# Patient Record
Sex: FEMALE | NOT HISPANIC OR LATINO | Employment: OTHER | URBAN - METROPOLITAN AREA
[De-identification: names, ages, dates, MRNs, and addresses within clinical notes are randomized per-mention and may not be internally consistent; named-entity substitution may affect disease eponyms.]

---

## 2023-05-04 ENCOUNTER — OFFICE VISIT (OUTPATIENT)
Dept: PRIMARY CARE | Facility: CLINIC | Age: 78
End: 2023-05-04
Payer: MEDICARE

## 2023-05-04 VITALS
HEIGHT: 62 IN | HEART RATE: 102 BPM | DIASTOLIC BLOOD PRESSURE: 83 MMHG | SYSTOLIC BLOOD PRESSURE: 138 MMHG | TEMPERATURE: 97.8 F | WEIGHT: 243 LBS | BODY MASS INDEX: 44.72 KG/M2 | OXYGEN SATURATION: 94 %

## 2023-05-04 DIAGNOSIS — Z00.00 ROUTINE GENERAL MEDICAL EXAMINATION AT HEALTH CARE FACILITY: Primary | ICD-10-CM

## 2023-05-04 DIAGNOSIS — R53.83 FATIGUE, UNSPECIFIED TYPE: ICD-10-CM

## 2023-05-04 DIAGNOSIS — E55.9 VITAMIN D DEFICIENCY: ICD-10-CM

## 2023-05-04 PROBLEM — M79.89 LEFT LEG SWELLING: Status: ACTIVE | Noted: 2023-05-04

## 2023-05-04 PROBLEM — K22.70 BARRETT'S ESOPHAGUS: Status: ACTIVE | Noted: 2023-05-04

## 2023-05-04 PROBLEM — M17.0 PRIMARY OSTEOARTHRITIS OF BOTH KNEES: Status: ACTIVE | Noted: 2017-03-09

## 2023-05-04 PROBLEM — E66.09 OBESITY DUE TO EXCESS CALORIES: Status: ACTIVE | Noted: 2017-05-20

## 2023-05-04 PROBLEM — E66.01 OBESITY, CLASS III, BMI 40-49.9 (MORBID OBESITY) (MULTI): Status: ACTIVE | Noted: 2017-05-20

## 2023-05-04 PROBLEM — M54.16 LEFT LUMBAR RADICULITIS: Status: ACTIVE | Noted: 2023-05-04

## 2023-05-04 PROBLEM — D17.9 LIPOMA OF JOINT: Status: ACTIVE | Noted: 2023-05-04

## 2023-05-04 PROBLEM — R13.10 DYSPHAGIA: Status: ACTIVE | Noted: 2017-01-20

## 2023-05-04 PROBLEM — E66.813 OBESITY, CLASS III, BMI 40-49.9 (MORBID OBESITY): Status: ACTIVE | Noted: 2017-05-20

## 2023-05-04 PROBLEM — E88.810 DYSMETABOLIC SYNDROME: Status: ACTIVE | Noted: 2023-05-04

## 2023-05-04 PROBLEM — E88.2 DERCUM'S DISEASE: Status: ACTIVE | Noted: 2023-05-04

## 2023-05-04 PROBLEM — S83.207A: Status: ACTIVE | Noted: 2017-03-09

## 2023-05-04 PROBLEM — R53.81 DEBILITY: Status: ACTIVE | Noted: 2023-05-04

## 2023-05-04 PROBLEM — E78.5 HYPERLIPIDEMIA: Status: ACTIVE | Noted: 2023-05-04

## 2023-05-04 PROCEDURE — G0439 PPPS, SUBSEQ VISIT: HCPCS | Performed by: INTERNAL MEDICINE

## 2023-05-04 PROCEDURE — 1036F TOBACCO NON-USER: CPT | Performed by: INTERNAL MEDICINE

## 2023-05-04 PROCEDURE — 1170F FXNL STATUS ASSESSED: CPT | Performed by: INTERNAL MEDICINE

## 2023-05-04 PROCEDURE — 96372 THER/PROPH/DIAG INJ SC/IM: CPT | Performed by: INTERNAL MEDICINE

## 2023-05-04 PROCEDURE — 1125F AMNT PAIN NOTED PAIN PRSNT: CPT | Performed by: INTERNAL MEDICINE

## 2023-05-04 PROCEDURE — 1159F MED LIST DOCD IN RCRD: CPT | Performed by: INTERNAL MEDICINE

## 2023-05-04 RX ORDER — OMEPRAZOLE 40 MG/1
40 CAPSULE, DELAYED RELEASE ORAL DAILY
COMMUNITY

## 2023-05-04 RX ORDER — ACETAMINOPHEN 500 MG
1000 TABLET ORAL EVERY 6 HOURS PRN
COMMUNITY
Start: 2017-02-21

## 2023-05-04 RX ORDER — CYANOCOBALAMIN 1000 UG/ML
1000 INJECTION, SOLUTION INTRAMUSCULAR; SUBCUTANEOUS ONCE
Status: COMPLETED | OUTPATIENT
Start: 2023-05-04 | End: 2023-05-04

## 2023-05-04 RX ORDER — NAPROXEN SODIUM 220 MG
TABLET ORAL
COMMUNITY
Start: 2012-02-24

## 2023-05-04 RX ORDER — MULTIVITAMIN
1 TABLET ORAL
COMMUNITY
Start: 2012-02-24

## 2023-05-04 RX ORDER — METFORMIN HYDROCHLORIDE 1000 MG/1
1000 TABLET ORAL 2 TIMES DAILY
COMMUNITY
End: 2024-02-15

## 2023-05-04 RX ADMIN — CYANOCOBALAMIN 1000 MCG: 1000 INJECTION, SOLUTION INTRAMUSCULAR; SUBCUTANEOUS at 13:47

## 2023-05-04 ASSESSMENT — ACTIVITIES OF DAILY LIVING (ADL)
BATHING: INDEPENDENT
GROCERY_SHOPPING: INDEPENDENT
DOING_HOUSEWORK: INDEPENDENT
DRESSING: INDEPENDENT
MANAGING_FINANCES: INDEPENDENT
TAKING_MEDICATION: INDEPENDENT

## 2023-05-04 ASSESSMENT — PATIENT HEALTH QUESTIONNAIRE - PHQ9
SUM OF ALL RESPONSES TO PHQ9 QUESTIONS 1 AND 2: 0
1. LITTLE INTEREST OR PLEASURE IN DOING THINGS: NOT AT ALL
2. FEELING DOWN, DEPRESSED OR HOPELESS: NOT AT ALL

## 2023-05-04 NOTE — PROGRESS NOTES
"Subjective   Reason for Visit: Vee Reynoso is an 77 y.o. female here for a Medicare Wellness visit.     Past Medical, Surgical, and Family History reviewed and updated in chart.    Reviewed all medications by prescribing practitioner or clinical pharmacist (such as prescriptions, OTCs, herbal therapies and supplements) and documented in the medical record.    HPI  Here for Medicare wellness and Follow up.    She continued to lose some weight. But slowing down.  She Complains of feeling tired lately, she wakes up and gets up early but after drinking coffee etc, she feels sleepy tired and would go back to sleep till 10 or 11am. This ruins her day's schedule per pt. No sob cp leg edema.  No other specific symptoms  She would like b12 shot to see if that helps.      Patient Care Team:  Nick Johnson MD as PCP - General  Nick Johnson MD as PCP [facility]     Review of Systems  She has appointment with GI, for both EGD for loza's esophagus Follow up and for Colonoscopy for recent positive cologuard.  She does not want mammogram ordered at this time.      Objective   Vitals:  /83   Pulse 102   Temp 36.6 °C (97.8 °F)   Ht 1.575 m (5' 2\")   Wt 110 kg (243 lb)   SpO2 94%   BMI 44.45 kg/m²       Physical Exam  No carotid bruit  Neck exam showed no mass, lymphadenopathy, or thyroid enlargement, and no carotid bruit.  Heart exam showed normal heart sounds, no murmur, clicks, gallops or rubs. Regular rate and rhythm. Chest is clear; no wheezes or rales.  No Abdominal pain  No leg edema.    Assessment/Plan   Problem List Items Addressed This Visit    None  Visit Diagnoses       Routine general medical examination at health care facility    -  Primary            She will come in for Follow up to get weight bp checked before returning to see Dr. Ladd.  B12 1000mcg injection     "

## 2023-05-04 NOTE — PROGRESS NOTES
Medicare Wellness Billing Compliance Satisfied    Review all medications by prescribing practitioner or clinical pharmacist (such as prescriptions, OTCs, herbal therapies and supplements) documented in the medical record    Past Medical, Surgical, and Family History reviewed and updated in chart    Tobacco Use Reviewed    Alcohol Use Reviewed    Illicit Drug Use Reviewed    PHQ2/9    Falls in Last Year Reviewed    Home Safety Risk Factors Reviewed    Cognitive Impairment Reviewed    Patient Self Assessment and Health Status    Current Diet Reviewed    Exercise Frequency    ADL - Hearing Impairment    ADL - Bathing    ADL - Dressing    ADL - Walks in Home    IADL - Managing Finances    IADL - Grocery Shopping    IADL - Taking Medications    IADL - Doing Housework

## 2023-05-11 ENCOUNTER — LAB (OUTPATIENT)
Dept: LAB | Facility: LAB | Age: 78
End: 2023-05-11
Payer: MEDICARE

## 2023-05-11 DIAGNOSIS — Z00.00 ROUTINE GENERAL MEDICAL EXAMINATION AT HEALTH CARE FACILITY: ICD-10-CM

## 2023-05-11 DIAGNOSIS — R53.83 FATIGUE, UNSPECIFIED TYPE: ICD-10-CM

## 2023-05-11 DIAGNOSIS — E55.9 VITAMIN D DEFICIENCY: ICD-10-CM

## 2023-05-11 LAB
ALANINE AMINOTRANSFERASE (SGPT) (U/L) IN SER/PLAS: 13 U/L (ref 7–45)
ALBUMIN (G/DL) IN SER/PLAS: 4 G/DL (ref 3.4–5)
ALKALINE PHOSPHATASE (U/L) IN SER/PLAS: 71 U/L (ref 33–136)
ANION GAP IN SER/PLAS: 16 MMOL/L (ref 10–20)
ASPARTATE AMINOTRANSFERASE (SGOT) (U/L) IN SER/PLAS: 16 U/L (ref 9–39)
BILIRUBIN TOTAL (MG/DL) IN SER/PLAS: 0.4 MG/DL (ref 0–1.2)
CALCIDIOL (25 OH VITAMIN D3) (NG/ML) IN SER/PLAS: 57 NG/ML
CALCIUM (MG/DL) IN SER/PLAS: 10.2 MG/DL (ref 8.6–10.6)
CARBON DIOXIDE, TOTAL (MMOL/L) IN SER/PLAS: 24 MMOL/L (ref 21–32)
CHLORIDE (MMOL/L) IN SER/PLAS: 107 MMOL/L (ref 98–107)
CHOLESTEROL (MG/DL) IN SER/PLAS: 207 MG/DL (ref 0–199)
CHOLESTEROL IN HDL (MG/DL) IN SER/PLAS: 69.5 MG/DL
CHOLESTEROL/HDL RATIO: 3
CREATININE (MG/DL) IN SER/PLAS: 0.8 MG/DL (ref 0.5–1.05)
ERYTHROCYTE DISTRIBUTION WIDTH (RATIO) BY AUTOMATED COUNT: 14.1 % (ref 11.5–14.5)
ERYTHROCYTE MEAN CORPUSCULAR HEMOGLOBIN CONCENTRATION (G/DL) BY AUTOMATED: 31.9 G/DL (ref 32–36)
ERYTHROCYTE MEAN CORPUSCULAR VOLUME (FL) BY AUTOMATED COUNT: 95 FL (ref 80–100)
ERYTHROCYTES (10*6/UL) IN BLOOD BY AUTOMATED COUNT: 4.66 X10E12/L (ref 4–5.2)
ESTIMATED AVERAGE GLUCOSE FOR HBA1C: 103 MG/DL
GFR FEMALE: 76 ML/MIN/1.73M2
GLUCOSE (MG/DL) IN SER/PLAS: 72 MG/DL (ref 74–99)
HEMATOCRIT (%) IN BLOOD BY AUTOMATED COUNT: 44.2 % (ref 36–46)
HEMOGLOBIN (G/DL) IN BLOOD: 14.1 G/DL (ref 12–16)
HEMOGLOBIN A1C/HEMOGLOBIN TOTAL IN BLOOD: 5.2 %
LDL: 119 MG/DL (ref 0–99)
LEUKOCYTES (10*3/UL) IN BLOOD BY AUTOMATED COUNT: 7.5 X10E9/L (ref 4.4–11.3)
NRBC (PER 100 WBCS) BY AUTOMATED COUNT: 0 /100 WBC (ref 0–0)
PLATELETS (10*3/UL) IN BLOOD AUTOMATED COUNT: 392 X10E9/L (ref 150–450)
POTASSIUM (MMOL/L) IN SER/PLAS: 4.6 MMOL/L (ref 3.5–5.3)
PROTEIN TOTAL: 6.6 G/DL (ref 6.4–8.2)
SODIUM (MMOL/L) IN SER/PLAS: 142 MMOL/L (ref 136–145)
TRIGLYCERIDE (MG/DL) IN SER/PLAS: 91 MG/DL (ref 0–149)
UREA NITROGEN (MG/DL) IN SER/PLAS: 19 MG/DL (ref 6–23)
VLDL: 18 MG/DL (ref 0–40)

## 2023-05-11 PROCEDURE — 80053 COMPREHEN METABOLIC PANEL: CPT

## 2023-05-11 PROCEDURE — 36415 COLL VENOUS BLD VENIPUNCTURE: CPT

## 2023-05-11 PROCEDURE — 83036 HEMOGLOBIN GLYCOSYLATED A1C: CPT

## 2023-05-11 PROCEDURE — 85027 COMPLETE CBC AUTOMATED: CPT

## 2023-05-11 PROCEDURE — 82306 VITAMIN D 25 HYDROXY: CPT

## 2023-05-11 PROCEDURE — 80061 LIPID PANEL: CPT

## 2023-05-12 ENCOUNTER — TELEPHONE (OUTPATIENT)
Dept: PRIMARY CARE | Facility: CLINIC | Age: 78
End: 2023-05-12
Payer: MEDICARE

## 2023-05-12 NOTE — TELEPHONE ENCOUNTER
Pt is excited about how the B12 took effect.  She is inquiring on if she could get more injections and how often?

## 2023-07-11 ENCOUNTER — OFFICE VISIT (OUTPATIENT)
Dept: PRIMARY CARE | Facility: CLINIC | Age: 78
End: 2023-07-11
Payer: MEDICARE

## 2023-07-11 VITALS
TEMPERATURE: 97.8 F | OXYGEN SATURATION: 91 % | HEART RATE: 98 BPM | SYSTOLIC BLOOD PRESSURE: 133 MMHG | BODY MASS INDEX: 44.53 KG/M2 | HEIGHT: 62 IN | WEIGHT: 242 LBS | DIASTOLIC BLOOD PRESSURE: 77 MMHG

## 2023-07-11 DIAGNOSIS — R53.82 CHRONIC FATIGUE: Primary | ICD-10-CM

## 2023-07-11 PROCEDURE — 96372 THER/PROPH/DIAG INJ SC/IM: CPT | Performed by: INTERNAL MEDICINE

## 2023-07-11 PROCEDURE — 1036F TOBACCO NON-USER: CPT | Performed by: INTERNAL MEDICINE

## 2023-07-11 PROCEDURE — 99213 OFFICE O/P EST LOW 20 MIN: CPT | Performed by: INTERNAL MEDICINE

## 2023-07-11 PROCEDURE — 1159F MED LIST DOCD IN RCRD: CPT | Performed by: INTERNAL MEDICINE

## 2023-07-11 PROCEDURE — 1125F AMNT PAIN NOTED PAIN PRSNT: CPT | Performed by: INTERNAL MEDICINE

## 2023-07-11 RX ORDER — CYANOCOBALAMIN 1000 UG/ML
1000 INJECTION, SOLUTION INTRAMUSCULAR; SUBCUTANEOUS ONCE
Status: COMPLETED | OUTPATIENT
Start: 2023-07-11 | End: 2023-07-11

## 2023-07-11 RX ADMIN — CYANOCOBALAMIN 1000 MCG: 1000 INJECTION, SOLUTION INTRAMUSCULAR; SUBCUTANEOUS at 13:51

## 2023-07-11 NOTE — PROGRESS NOTES
"PCP: Nick Johnson MD   I have reviewed existing histories, notes, past medical history, surgical history, social history, family history, med list, allergy list, and immunization, and updated.    HPI:   Follow up fatigue  She received a b12 injection in office last visit, since then she is feeling better, and would like to get another injection.   She has appointment to see Dr. Ladd for obesity in two weeks.  No sob cp or leg edema  Lab Results   Component Value Date    WBC 7.5 05/11/2023    HGB 14.1 05/11/2023    HCT 44.2 05/11/2023     05/11/2023    CHOL 207 (H) 05/11/2023    TRIG 91 05/11/2023    HDL 69.5 05/11/2023    LDLDIRECT 138 (H) 08/11/2021    ALT 13 05/11/2023    AST 16 05/11/2023     05/11/2023    K 4.6 05/11/2023     05/11/2023    CREATININE 0.80 05/11/2023    BUN 19 05/11/2023    CO2 24 05/11/2023    TSH 1.72 08/11/2021    HGBA1C 5.2 05/11/2023     Physical exam:  /77   Pulse 98   Temp 36.6 °C (97.8 °F)   Ht 1.575 m (5' 2\")   Wt 110 kg (242 lb)   SpO2 91%   BMI 44.26 kg/m²    Neck exam showed no mass, lymphadenopathy, or thyroid enlargement, and no carotid bruit.  No carotid bruit  Heart RR  Lungs clear  No leg edema    Assessments/orders:   1. Chronic fatigue   B12 1000 mcg subcutaneous, x1  Follow up at end of October                "

## 2023-10-11 ENCOUNTER — APPOINTMENT (OUTPATIENT)
Dept: PRIMARY CARE | Facility: CLINIC | Age: 78
End: 2023-10-11
Payer: MEDICARE

## 2023-10-24 ENCOUNTER — APPOINTMENT (OUTPATIENT)
Dept: PRIMARY CARE | Facility: CLINIC | Age: 78
End: 2023-10-24
Payer: MEDICARE

## 2023-10-24 ENCOUNTER — OFFICE VISIT (OUTPATIENT)
Dept: PRIMARY CARE | Facility: CLINIC | Age: 78
End: 2023-10-24
Payer: MEDICARE

## 2023-10-24 VITALS
BODY MASS INDEX: 45.45 KG/M2 | OXYGEN SATURATION: 94 % | HEART RATE: 75 BPM | DIASTOLIC BLOOD PRESSURE: 79 MMHG | SYSTOLIC BLOOD PRESSURE: 138 MMHG | WEIGHT: 247 LBS | HEIGHT: 62 IN | TEMPERATURE: 98.1 F

## 2023-10-24 DIAGNOSIS — E55.9 VITAMIN D DEFICIENCY: ICD-10-CM

## 2023-10-24 DIAGNOSIS — Z23 NEED FOR PNEUMOCOCCAL 20-VALENT CONJUGATE VACCINATION: ICD-10-CM

## 2023-10-24 DIAGNOSIS — R53.83 OTHER FATIGUE: Primary | ICD-10-CM

## 2023-10-24 PROBLEM — R19.5 POSITIVE COLORECTAL CANCER SCREENING USING COLOGUARD TEST: Status: ACTIVE | Noted: 2023-10-24

## 2023-10-24 PROBLEM — R73.01 IMPAIRED FASTING GLUCOSE: Status: ACTIVE | Noted: 2023-05-19

## 2023-10-24 PROBLEM — G89.29 CHRONIC LOW BACK PAIN: Status: ACTIVE | Noted: 2023-05-19

## 2023-10-24 PROBLEM — R42 VERTIGO: Status: ACTIVE | Noted: 2023-05-19

## 2023-10-24 PROBLEM — M54.50 CHRONIC LOW BACK PAIN: Status: ACTIVE | Noted: 2023-05-19

## 2023-10-24 PROCEDURE — 96372 THER/PROPH/DIAG INJ SC/IM: CPT | Performed by: INTERNAL MEDICINE

## 2023-10-24 PROCEDURE — 1036F TOBACCO NON-USER: CPT | Performed by: INTERNAL MEDICINE

## 2023-10-24 PROCEDURE — 1125F AMNT PAIN NOTED PAIN PRSNT: CPT | Performed by: INTERNAL MEDICINE

## 2023-10-24 PROCEDURE — G0009 ADMIN PNEUMOCOCCAL VACCINE: HCPCS | Performed by: INTERNAL MEDICINE

## 2023-10-24 PROCEDURE — 99213 OFFICE O/P EST LOW 20 MIN: CPT | Performed by: INTERNAL MEDICINE

## 2023-10-24 PROCEDURE — 90677 PCV20 VACCINE IM: CPT | Performed by: INTERNAL MEDICINE

## 2023-10-24 PROCEDURE — 1159F MED LIST DOCD IN RCRD: CPT | Performed by: INTERNAL MEDICINE

## 2023-10-24 RX ORDER — CYANOCOBALAMIN 1000 UG/ML
1000 INJECTION, SOLUTION INTRAMUSCULAR; SUBCUTANEOUS ONCE
Status: COMPLETED | OUTPATIENT
Start: 2023-10-24 | End: 2023-10-24

## 2023-10-24 RX ORDER — TRAMADOL HYDROCHLORIDE 50 MG/1
TABLET ORAL
COMMUNITY
Start: 2023-07-27 | End: 2024-05-07 | Stop reason: ALTCHOICE

## 2023-10-24 RX ADMIN — CYANOCOBALAMIN 1000 MCG: 1000 INJECTION, SOLUTION INTRAMUSCULAR; SUBCUTANEOUS at 11:07

## 2023-10-24 NOTE — PROGRESS NOTES
"PCP: Nick Johnson MD   I have reviewed existing histories, notes, past medical history, surgical history, social history, family history, med list, allergy list, and immunization, and updated.    HPI:   Pt felt better after vit b12 injection in office, and wanted to continue.  Has appointment to see Dr. Ladd this week, for obesity tx.   Doing alright  No concerns  We mainly discussed vaccines today. She declined flu shot, but open for all other vaccines. I suggest that she gets RSV, new covid booster, tdap, shingrix from pharmacy. Will get prevnar 20 today at our office.    Lab Results   Component Value Date    WBC CANCELED 07/27/2023    HGB CANCELED 07/27/2023    HCT CANCELED 07/27/2023    PLT CANCELED 07/27/2023    CHOL 207 (H) 05/11/2023    TRIG 91 05/11/2023    HDL 69.5 05/11/2023    LDLDIRECT 138 (H) 08/11/2021    ALT 15 07/27/2023    AST 41 (H) 07/27/2023     07/27/2023    K 4.7 07/27/2023     (H) 07/27/2023    CREATININE 0.65 07/27/2023    BUN 17 07/27/2023    CO2 21 07/27/2023    TSH 1.72 08/11/2021    HGBA1C 5.2 05/11/2023     Physical exam:  /79   Pulse 75   Temp 36.7 °C (98.1 °F)   Ht 1.562 m (5' 1.5\")   Wt 112 kg (247 lb)   SpO2 94%   BMI 45.91 kg/m²    No carotid bruit   Heart rr  Lungs clear  No Abdominal pain   No leg edema.    Assessments/orders:   1. Other fatigue  cyanocobalamin (Vitamin B-12) injection 1,000 mcg      2. Vitamin D deficiency        3. Need for pneumococcal 20-valent conjugate vaccination  Pneumococcal conjugate vaccine, 20-valent (PREVNAR 20)      Prevnar 20 today             "

## 2023-10-25 RX ORDER — METFORMIN HYDROCHLORIDE 500 MG/1
2 TABLET ORAL 2 TIMES DAILY
COMMUNITY
End: 2024-05-07 | Stop reason: ALTCHOICE

## 2024-02-15 DIAGNOSIS — E88.810 DYSMETABOLIC SYNDROME: Primary | ICD-10-CM

## 2024-02-15 RX ORDER — METFORMIN HYDROCHLORIDE 1000 MG/1
1000 TABLET ORAL 2 TIMES DAILY
Qty: 180 TABLET | Refills: 3 | Status: SHIPPED | OUTPATIENT
Start: 2024-02-15 | End: 2024-05-07 | Stop reason: ALTCHOICE

## 2024-03-28 ENCOUNTER — OFFICE VISIT (OUTPATIENT)
Dept: PRIMARY CARE | Facility: CLINIC | Age: 79
End: 2024-03-28
Payer: MEDICARE

## 2024-03-28 DIAGNOSIS — E55.9 VITAMIN D DEFICIENCY: ICD-10-CM

## 2024-03-28 PROCEDURE — 1036F TOBACCO NON-USER: CPT | Performed by: INTERNAL MEDICINE

## 2024-03-28 PROCEDURE — 96372 THER/PROPH/DIAG INJ SC/IM: CPT | Performed by: INTERNAL MEDICINE

## 2024-03-28 PROCEDURE — 1159F MED LIST DOCD IN RCRD: CPT | Performed by: INTERNAL MEDICINE

## 2024-03-28 RX ORDER — CYANOCOBALAMIN 1000 UG/ML
1000 INJECTION, SOLUTION INTRAMUSCULAR; SUBCUTANEOUS ONCE
Status: COMPLETED | OUTPATIENT
Start: 2024-03-28 | End: 2024-03-28

## 2024-03-28 RX ADMIN — CYANOCOBALAMIN 1000 MCG: 1000 INJECTION, SOLUTION INTRAMUSCULAR; SUBCUTANEOUS at 13:11

## 2024-03-28 NOTE — PROGRESS NOTES
Patient came in today for b12 inj . Injection was given in the left deltoid. Injection tolerated well.

## 2024-05-06 ENCOUNTER — APPOINTMENT (OUTPATIENT)
Dept: PRIMARY CARE | Facility: CLINIC | Age: 79
End: 2024-05-06
Payer: MEDICARE

## 2024-05-07 ENCOUNTER — HOSPITAL ENCOUNTER (OUTPATIENT)
Dept: RADIOLOGY | Facility: CLINIC | Age: 79
Discharge: HOME | End: 2024-05-07
Payer: MEDICARE

## 2024-05-07 ENCOUNTER — OFFICE VISIT (OUTPATIENT)
Dept: PRIMARY CARE | Facility: CLINIC | Age: 79
End: 2024-05-07
Payer: MEDICARE

## 2024-05-07 VITALS
BODY MASS INDEX: 47.48 KG/M2 | WEIGHT: 258 LBS | HEART RATE: 85 BPM | SYSTOLIC BLOOD PRESSURE: 122 MMHG | HEIGHT: 62 IN | TEMPERATURE: 98.1 F | DIASTOLIC BLOOD PRESSURE: 61 MMHG | OXYGEN SATURATION: 94 %

## 2024-05-07 DIAGNOSIS — E66.01 OBESITY, CLASS III, BMI 40-49.9 (MORBID OBESITY) (MULTI): ICD-10-CM

## 2024-05-07 DIAGNOSIS — Z78.0 MENOPAUSE: ICD-10-CM

## 2024-05-07 DIAGNOSIS — D17.24 LIPOMA OF LEFT LOWER EXTREMITY: ICD-10-CM

## 2024-05-07 DIAGNOSIS — Z00.00 MEDICARE ANNUAL WELLNESS VISIT, SUBSEQUENT: Primary | ICD-10-CM

## 2024-05-07 DIAGNOSIS — D17.23 LIPOMA OF RIGHT LOWER EXTREMITY: ICD-10-CM

## 2024-05-07 PROCEDURE — 1159F MED LIST DOCD IN RCRD: CPT | Performed by: INTERNAL MEDICINE

## 2024-05-07 PROCEDURE — G0439 PPPS, SUBSEQ VISIT: HCPCS | Performed by: INTERNAL MEDICINE

## 2024-05-07 PROCEDURE — 1123F ACP DISCUSS/DSCN MKR DOCD: CPT | Performed by: INTERNAL MEDICINE

## 2024-05-07 PROCEDURE — 99214 OFFICE O/P EST MOD 30 MIN: CPT | Performed by: INTERNAL MEDICINE

## 2024-05-07 PROCEDURE — 1170F FXNL STATUS ASSESSED: CPT | Performed by: INTERNAL MEDICINE

## 2024-05-07 PROCEDURE — 1158F ADVNC CARE PLAN TLK DOCD: CPT | Performed by: INTERNAL MEDICINE

## 2024-05-07 PROCEDURE — 1036F TOBACCO NON-USER: CPT | Performed by: INTERNAL MEDICINE

## 2024-05-07 ASSESSMENT — ACTIVITIES OF DAILY LIVING (ADL)
MANAGING_FINANCES: INDEPENDENT
DOING_HOUSEWORK: INDEPENDENT
BATHING: INDEPENDENT
DRESSING: INDEPENDENT
GROCERY_SHOPPING: INDEPENDENT
TAKING_MEDICATION: INDEPENDENT

## 2024-05-07 ASSESSMENT — PATIENT HEALTH QUESTIONNAIRE - PHQ9
2. FEELING DOWN, DEPRESSED OR HOPELESS: NOT AT ALL
1. LITTLE INTEREST OR PLEASURE IN DOING THINGS: NOT AT ALL
SUM OF ALL RESPONSES TO PHQ9 QUESTIONS 1 AND 2: 0

## 2024-05-07 NOTE — PROGRESS NOTES
"SUBJECTIVE:   Vee Reynoso is a 78 y.o. female presenting for her annual physical/wellness.  PCP: Nick Johnson MD  Medicare wellness and Follow up  She feels well.  Would like to get the large lipoma off lower legs. She has Dercum's disease. She would like a referral.  Also struggling with obesity. Used to see Dr. Ladd, since she does not drive longer distance any more, it is too far to go see him. Would like referral closer.      Colon screening:  had F-scope in 2023, normal   Last pap: na  Last mammogram: she does not want to get it done at this time  Dexa  due  Vaccines  Up to date   Diet:   healthy in general  Exercises:  somewhat regularly  Lab Results   Component Value Date    HGBA1C 5.2 05/11/2023     Lab Results   Component Value Date    CREATININE 0.65 07/27/2023     Lab Results   Component Value Date    WBC CANCELED 07/27/2023    HGB CANCELED 07/27/2023    HCT CANCELED 07/27/2023    MCV CANCELED 07/27/2023    PLT CANCELED 07/27/2023     Lab Results   Component Value Date    CHOL 207 (H) 05/11/2023    CHOL 221 (H) 11/21/2022    CHOL 216 (H) 08/11/2021     Lab Results   Component Value Date    HDL 69.5 05/11/2023    HDL 69.8 11/21/2022    HDL 58.1 08/11/2021     No results found for: \"LDLCALC\"  Lab Results   Component Value Date    TRIG 91 05/11/2023    TRIG 125 11/21/2022     No components found for: \"CHOLHDL\"       ROS:   Feeling well. No dyspnea or chest pain on exertion. No abdominal pain, change in bowel habits, black or bloody stools. No urinary tract or  symptoms.  No breast concerns. No neurological complaints.    OBJECTIVE:   The patient appears well, alert, oriented x 3, in no distress.   /61   Pulse 85   Temp 36.7 °C (98.1 °F)   Ht 1.562 m (5' 1.5\")   Wt 117 kg (258 lb)   SpO2 94%   BMI 47.96 kg/m²   ENT normal.  Neck supple. No adenopathy or thyromegaly. CHAYA. Lungs are clear, good air entry, no wheezes, rhonchi or rales. S1 and S2 normal, no murmurs, regular rate and rhythm. " Abdomen is soft without tenderness, guarding, mass or organomegaly.  exam deferred to Gyn. Extremities show no edema, normal peripheral pulses. Neurological is normal without focal findings.      1. Medicare annual wellness visit, subsequent        2. Obesity, Class III, BMI 40-49.9 (morbid obesity) (Multi)  Referral to Endocrinology      3. Menopause  XR DEXA bone density      4. Lipoma of left lower extremity  Referral to General Surgery      5. Lipoma of right lower extremity  Referral to General Surgery

## 2024-05-20 ENCOUNTER — HOSPITAL ENCOUNTER (OUTPATIENT)
Dept: RADIOLOGY | Facility: CLINIC | Age: 79
Discharge: HOME | End: 2024-05-20
Payer: MEDICARE

## 2024-05-20 PROCEDURE — 77080 DXA BONE DENSITY AXIAL: CPT

## 2024-05-20 PROCEDURE — 77080 DXA BONE DENSITY AXIAL: CPT | Performed by: RADIOLOGY

## 2024-07-29 ENCOUNTER — APPOINTMENT (OUTPATIENT)
Dept: SURGERY | Facility: CLINIC | Age: 79
End: 2024-07-29
Payer: MEDICARE

## 2024-07-29 VITALS
WEIGHT: 256 LBS | TEMPERATURE: 97 F | BODY MASS INDEX: 47.11 KG/M2 | HEART RATE: 69 BPM | RESPIRATION RATE: 17 BRPM | HEIGHT: 62 IN | OXYGEN SATURATION: 98 % | DIASTOLIC BLOOD PRESSURE: 85 MMHG | SYSTOLIC BLOOD PRESSURE: 158 MMHG

## 2024-07-29 DIAGNOSIS — D17.23 LIPOMA OF RIGHT LOWER EXTREMITY: ICD-10-CM

## 2024-07-29 DIAGNOSIS — D17.24 LIPOMA OF LEFT LOWER EXTREMITY: ICD-10-CM

## 2024-07-29 PROCEDURE — 1159F MED LIST DOCD IN RCRD: CPT | Performed by: SURGERY

## 2024-07-29 PROCEDURE — 99203 OFFICE O/P NEW LOW 30 MIN: CPT | Performed by: SURGERY

## 2024-07-29 PROCEDURE — 1036F TOBACCO NON-USER: CPT | Performed by: SURGERY

## 2024-07-29 PROCEDURE — 1123F ACP DISCUSS/DSCN MKR DOCD: CPT | Performed by: SURGERY

## 2024-07-29 NOTE — PROGRESS NOTES
History Of Present Illness :  Vee Reynoso is a very pleasant 78 y.o. female who presents on referral from Dr. Nick Johnson for a skin and soft tissue evaluation.  The patient was most recently seen by Dr. Johnson for her annual exam on 5/7/2024 and that note was reviewed.    The patient notes a roughly 2-year history of subcutaneous thickened fatty tissue along the anterior medial aspect of each leg, proximally.  These areas are asymptomatic.  The cause no pain or discomfort.  There is been no skin changes or redness.  She has had no drainage.  There has been no imaging.    The patient has morbid obesity with a BMI of nearly 48, and also has hyperlipidemia.  She also carries the diagnosis of:  Dercum’s disease (adiposis dolorosa) (a rare disease of unknown etiology characterized by painful subcutaneous adipose tissue deposits with various localization over the body).       The patient is a .  She lives in [place] in a [facility].  She does drive a car.    Past Medical History   Past Medical History:   Diagnosis Date    Other intervertebral disc displacement, lumbar region 05/10/2018    Herniated lumbar disc without myelopathy    Personal history of other diseases of the nervous system and sense organs 02/16/2018    History of sciatica        Surgical History    Past Surgical History:   Procedure Laterality Date    ANKLE SURGERY      POLYPECTOMY          Allergies   Allergies   Allergen Reactions    Penicillins Hives        Home Meds  Current Outpatient Medications   Medication Instructions    acetaminophen (TYLENOL) 1,000 mg, oral, Every 6 hours PRN    multivitamin tablet 1 tablet, oral, Daily RT    naproxen sodium (Aleve) 220 mg tablet oral    omeprazole (PRILOSEC) 40 mg, oral, Daily    psyllium (Metamucil) powder oral        Family History    Family History   Problem Relation Name Age of Onset    No Known Problems Mother      No Known Problems Father          Social History  Social History     Tobacco  Use    Smoking status: Never    Smokeless tobacco: Never   Substance Use Topics    Alcohol use: Never    Drug use: Never        Review Of Systems    Review of Systems    General: Not Present- Cancer, HIV, MRSA, Recent Cold/Flu, Tired During the Day and VRE.  HEENT: Not Present- Migraine, Cataracts, Glaucoma, Macular Degeneration and Retinal Detachment.  Respiratory: Not Present- Asthma, Chronic Cough, Difficulty Breathing on Exertion, Difficulty Breathing at Rest, Emphysema, Frequent Bronchitis, Home CPAP/BiPAP, Home Oxygen, Pulmonary Embolus, Pneumonia/TB, Sleep Apnea and Snoring.  Cardiovascular: Not Present- Chest Pain, Congestive Heart Failure, Heart Attack, Coronary Artery Disease, Heart Stent, Hypertension, Internal Defibrillator, Irregular Heart Beat, Mitral Valve Prolapse, Murmur, Pacemaker and Peripheral Vascular Disease.  Gastrointestinal: Not Present- Heartburn, Hepatitis, Hiatal Hernia, Jaundice, Reflux, Stomach Ulcer and IBS.  Female Genitourinary: Not Present- Kidney Failure, Kidney Stones, Dialysis and Urinary Tract Infection.  Musculoskeletal: Not Present- Arthritis, Back Pain and Fibromyalgia.  Neurological: Not Present- Headaches, Numbness, Tingling, Seizures, Stroke,  Shunt and Weakness.  Psychiatric: Not Present- Anxiety, Bipolar, Depression and Panic Attacks.  Endocrine: Not Present- Diabetes, Hyperthyroidism, Hypothyroidism and Low Blood Sugar.  Hematology: Not Present- Abnormal Bleeding, Anemia and Blood Clots.    Vitals  There were no vitals taken for this visit.     Physical Exam   Skin & Soft Tissue Exam    Integumentary  Global Assessment: Examination of related systems reveals - Well-developed, well-nourished and in no acute distress; alert and oriented x 3,  Neck supple, with no palpable masses, no thyromegaly, No lymphadenopathy and Apocrine and  eccrine glands are normal with no hyperhidrosis.   Upon inspection and palpation of skin surfaces of the - Head/Face: no rashes, ulcers,  lesions or evidence of photo damage. No palpable nodules or masses, Neck: no visible lesions or palpable masses, Chest: no swelling,scarring or lesions. No prominent arteries or veins observed, Axillae: non-tender, no inflammation or ulceration, no drainage., Abdomen: no scars, rashes or lesions, Back: normal skin surface, no evidence of  photo damage, no suspicious lesions, Right upper extremity: no lesions or rashes. No evidence of photo damage, Left upper extremity: no lesions or rashes. No evidence of photo damage, Right lower extremity: no stasis ulcers, rashes or suspicious lesions. No evidence of photo damage, Left lower extremity: no stasis ulcers, rashes or suspicious lesions. No evidence of  photo damage, Distribution of scalp and body hair is normal and No dandruff or other scalp lesions noted.    Bilateral lower extremities: On each lower extremity, just distal to the knee, on the anterior medial aspect, there is a 10 cm sagittal by at least 15 cm transverse area of diffuse thickened subcutaneous tissue.  This extends from the mid anterior line to the mid medial line.  This area is soft, ill-defined, and nontender.  The overlying skin is normal    Chest and Lung Exam  Chest and lung exam reveals - normal excursion with symmetric chest walls and on auscultation, normal breath sounds, no  adventitious sounds and normal vocal resonance.    Cardiovascular  Cardiovascular examination reveals - normal heart sounds, regular rate and rhythm with no murmurs.    Assessment/Plan   Mrs. Reynoso has thickened subcutaneous tissue of the bilateral anterior medial legs as described.  These are not well-defined lipomas clinically.    Recommendation:    As these areas are not well-defined lipomas or separate discrete or dominant masses, excision is not indicated and recommended.    In addition, the patient is at high risk for any type of therapy in this region secondary to her obesity and lower extremity varicosities.   She is at high risk for wound healing issues.    The patient may see a plastic surgeon if a second opinion is desired.  However, she seems content to live with the areas of thickened subcutaneous tissues as described.      Follow-up with me as needed

## 2024-08-02 ENCOUNTER — TELEPHONE (OUTPATIENT)
Dept: PRIMARY CARE | Facility: CLINIC | Age: 79
End: 2024-08-02
Payer: MEDICARE

## 2024-08-02 NOTE — TELEPHONE ENCOUNTER
PT STATES THAT SCIATICA IN HIP IS FL AIRED AND SHE WOULD LIKE TO KNOW IF YOU COULD SEND HER SOMETHING

## 2024-08-06 ENCOUNTER — OFFICE VISIT (OUTPATIENT)
Dept: PRIMARY CARE | Facility: CLINIC | Age: 79
End: 2024-08-06
Payer: MEDICARE

## 2024-08-06 VITALS
HEIGHT: 61 IN | HEART RATE: 89 BPM | SYSTOLIC BLOOD PRESSURE: 134 MMHG | BODY MASS INDEX: 49.61 KG/M2 | TEMPERATURE: 97.9 F | OXYGEN SATURATION: 95 % | DIASTOLIC BLOOD PRESSURE: 76 MMHG | WEIGHT: 262.79 LBS

## 2024-08-06 DIAGNOSIS — M54.31 SCIATICA OF RIGHT SIDE: Primary | ICD-10-CM

## 2024-08-06 PROCEDURE — 99214 OFFICE O/P EST MOD 30 MIN: CPT | Performed by: INTERNAL MEDICINE

## 2024-08-06 PROCEDURE — 1123F ACP DISCUSS/DSCN MKR DOCD: CPT | Performed by: INTERNAL MEDICINE

## 2024-08-06 RX ORDER — TIZANIDINE 4 MG/1
TABLET ORAL
Qty: 60 TABLET | Refills: 0 | Status: SHIPPED | OUTPATIENT
Start: 2024-08-06

## 2024-08-06 RX ORDER — METHYLPREDNISOLONE 4 MG/1
TABLET ORAL
Qty: 21 TABLET | Refills: 0 | Status: SHIPPED | OUTPATIENT
Start: 2024-08-06 | End: 2024-08-13

## 2024-08-06 NOTE — PROGRESS NOTES
"PCP: Nick Johnson MD   I have reviewed existing histories, notes, past medical history, surgical history, social history, family history, med list, allergy list, and immunization, and updated.    HPI:   For past few days, right sided pain from right side of low back to calf, no calf swelling.  No trigger  No injury  Hx of Lumbar DDD, had MRI of LS a few years ago, showed multiple level DDD.  No Fever and chills    Has some tramadol, took 3 tabs for past 24 hours, helping some.      Lab Results   Component Value Date    WBC CANCELED 07/27/2023    HGB CANCELED 07/27/2023    HCT CANCELED 07/27/2023    PLT CANCELED 07/27/2023    CHOL 207 (H) 05/11/2023    TRIG 91 05/11/2023    HDL 69.5 05/11/2023    LDLDIRECT 138 (H) 08/11/2021    ALT 15 07/27/2023    AST 41 (H) 07/27/2023     07/27/2023    K 4.7 07/27/2023     (H) 07/27/2023    CREATININE 0.65 07/27/2023    BUN 17 07/27/2023    CO2 21 07/27/2023    TSH 1.72 08/11/2021    HGBA1C 5.2 05/11/2023     Physical exam:  /76   Pulse 89   Temp 36.6 °C (97.9 °F)   Ht 1.549 m (5' 1\")   Wt 119 kg (262 lb 12.6 oz)   SpO2 95%   BMI 49.65 kg/m²    Lowe back to right leg, no vesicles.  No leg edema.  Pain from low back to the calf, not a tenderness  Pedal pulses are normal     Assessments/orders:   1. Sciatica of right side  methylPREDNISolone (Medrol Dospak) 4 mg tablets, tiZANidine (Zanaflex) 4 mg tablet      See rx  Follow up if not better                "

## 2024-09-20 ENCOUNTER — LAB (OUTPATIENT)
Dept: LAB | Facility: LAB | Age: 79
End: 2024-09-20
Payer: MEDICARE

## 2024-09-20 ENCOUNTER — APPOINTMENT (OUTPATIENT)
Dept: ENDOCRINOLOGY | Facility: CLINIC | Age: 79
End: 2024-09-20
Payer: MEDICARE

## 2024-09-20 VITALS
RESPIRATION RATE: 16 BRPM | HEIGHT: 61 IN | DIASTOLIC BLOOD PRESSURE: 70 MMHG | HEART RATE: 96 BPM | BODY MASS INDEX: 49.28 KG/M2 | WEIGHT: 261 LBS | SYSTOLIC BLOOD PRESSURE: 132 MMHG

## 2024-09-20 DIAGNOSIS — E66.01 OBESITY, CLASS III, BMI 40-49.9 (MORBID OBESITY) (MULTI): ICD-10-CM

## 2024-09-20 DIAGNOSIS — Z86.39: ICD-10-CM

## 2024-09-20 DIAGNOSIS — R53.83 OTHER FATIGUE: ICD-10-CM

## 2024-09-20 LAB
ALBUMIN SERPL BCP-MCNC: 4.4 G/DL (ref 3.4–5)
ALP SERPL-CCNC: 75 U/L (ref 33–136)
ALT SERPL W P-5'-P-CCNC: 11 U/L (ref 7–45)
ANION GAP SERPL CALC-SCNC: 17 MMOL/L (ref 10–20)
AST SERPL W P-5'-P-CCNC: 16 U/L (ref 9–39)
BILIRUB SERPL-MCNC: 0.5 MG/DL (ref 0–1.2)
BUN SERPL-MCNC: 20 MG/DL (ref 6–23)
CALCIUM SERPL-MCNC: 10.2 MG/DL (ref 8.6–10.6)
CHLORIDE SERPL-SCNC: 104 MMOL/L (ref 98–107)
CO2 SERPL-SCNC: 24 MMOL/L (ref 21–32)
CREAT SERPL-MCNC: 0.87 MG/DL (ref 0.5–1.05)
EGFRCR SERPLBLD CKD-EPI 2021: 69 ML/MIN/1.73M*2
ERYTHROCYTE [DISTWIDTH] IN BLOOD BY AUTOMATED COUNT: 14 % (ref 11.5–14.5)
EST. AVERAGE GLUCOSE BLD GHB EST-MCNC: 114 MG/DL
GLUCOSE SERPL-MCNC: 86 MG/DL (ref 74–99)
HBA1C MFR BLD: 5.6 %
HCT VFR BLD AUTO: 47.9 % (ref 36–46)
HGB BLD-MCNC: 15.3 G/DL (ref 12–16)
MCH RBC QN AUTO: 29.3 PG (ref 26–34)
MCHC RBC AUTO-ENTMCNC: 31.9 G/DL (ref 32–36)
MCV RBC AUTO: 92 FL (ref 80–100)
NRBC BLD-RTO: 0 /100 WBCS (ref 0–0)
PLATELET # BLD AUTO: 386 X10*3/UL (ref 150–450)
POTASSIUM SERPL-SCNC: 4.8 MMOL/L (ref 3.5–5.3)
PROT SERPL-MCNC: 7.4 G/DL (ref 6.4–8.2)
RBC # BLD AUTO: 5.23 X10*6/UL (ref 4–5.2)
SODIUM SERPL-SCNC: 140 MMOL/L (ref 136–145)
TSH SERPL-ACNC: 1.64 MIU/L (ref 0.44–3.98)
WBC # BLD AUTO: 8.5 X10*3/UL (ref 4.4–11.3)

## 2024-09-20 PROCEDURE — 80053 COMPREHEN METABOLIC PANEL: CPT

## 2024-09-20 PROCEDURE — 99213 OFFICE O/P EST LOW 20 MIN: CPT | Performed by: INTERNAL MEDICINE

## 2024-09-20 PROCEDURE — 1123F ACP DISCUSS/DSCN MKR DOCD: CPT | Performed by: INTERNAL MEDICINE

## 2024-09-20 PROCEDURE — 85027 COMPLETE CBC AUTOMATED: CPT

## 2024-09-20 PROCEDURE — 36415 COLL VENOUS BLD VENIPUNCTURE: CPT

## 2024-09-20 PROCEDURE — 1160F RVW MEDS BY RX/DR IN RCRD: CPT | Performed by: INTERNAL MEDICINE

## 2024-09-20 PROCEDURE — 83036 HEMOGLOBIN GLYCOSYLATED A1C: CPT

## 2024-09-20 PROCEDURE — 1159F MED LIST DOCD IN RCRD: CPT | Performed by: INTERNAL MEDICINE

## 2024-09-20 PROCEDURE — 84443 ASSAY THYROID STIM HORMONE: CPT

## 2024-09-20 PROCEDURE — 1036F TOBACCO NON-USER: CPT | Performed by: INTERNAL MEDICINE

## 2024-09-20 RX ORDER — PHENTERMINE AND TOPIRAMATE 7.5; 46 MG/1; MG/1
1 CAPSULE, EXTENDED RELEASE ORAL DAILY
Qty: 30 CAPSULE | Refills: 2 | Status: SHIPPED | OUTPATIENT
Start: 2024-09-20

## 2024-09-20 RX ORDER — PHENTERMINE AND TOPIRAMATE 3.75; 23 MG/1; MG/1
1 CAPSULE, EXTENDED RELEASE ORAL DAILY
Qty: 14 CAPSULE | Refills: 0 | Status: SHIPPED | OUTPATIENT
Start: 2024-09-20 | End: 2024-10-04

## 2024-09-20 ASSESSMENT — ENCOUNTER SYMPTOMS
PALPITATIONS: 0
SHORTNESS OF BREATH: 0
HEADACHES: 0
VOMITING: 0
FATIGUE: 0
DIARRHEA: 0
COUGH: 0
CHILLS: 0
FEVER: 0
NAUSEA: 0

## 2024-09-20 NOTE — ASSESSMENT & PLAN NOTE
Orders:    Referral to Endocrinology    Comprehensive Metabolic Panel; Future    CBC; Future    Hemoglobin A1C; Future    TSH with reflex to Free T4 if abnormal; Future    phentermine-topiramate (Qsymia) 7.5-46 mg capsule, ER multiphase 24 hr; Take 1 capsule by mouth once daily.    phentermine-topiramate (Qsymia) 3.75-23 mg capsule, ER multiphase 24 hr; Take 1 capsule by mouth once daily for 14 days.  discussed course  and options.  Discussed limits of weight loss med coverage on medicare  Discussed qsymia which she would like to try  Discussed potential side effects  Follow up in 3 months

## 2024-09-20 NOTE — PROGRESS NOTES
Endocrinology New Patient Consult  Subjective   Patient ID: Vee Reynoso is a 78 y.o. female who presents for Obesity. Patient was referred by Dr. Johnson.     PCP: Nick Johnson MD    HPI  78-year-old here for evaluation of obesity.  Of note she was seeing Dr. Ladd in 2023 so was considered to follow-up patient.  She has struggled with her weight for all of her adult life.  She has tried various dietary plans including weight watchers as well as metformin most recently.  Most plans and even the metformin she is able to lose about 30 pounds and then backslide's.  She was doing very well on metformin but then started having GI side effects even at reduced dose.  She has been trying to watch her eating habits and is very active but does struggle with intense exercise due to current back issues.    I have personally reviewed the OARRS report for  SB   . This is recorded in the EMR.  I have considered the risks of abuse, dependence, addiction, and diversion.  I believe that it is clinically appropriate for SB   to be prescribed this medication.      Review of Systems   Constitutional:  Negative for chills, fatigue and fever.   Respiratory:  Negative for cough and shortness of breath.    Cardiovascular:  Negative for chest pain and palpitations.   Gastrointestinal:  Negative for diarrhea, nausea and vomiting.   Neurological:  Negative for headaches.       Patient Active Problem List   Diagnosis    Vitamin D deficiency    Pseudophakia of left eye    Primary osteoarthritis of both knees    Positive Libby test of left knee    Pain in both hands    Obesity, Class III, BMI 40-49.9 (morbid obesity) (Multi)    Obesity due to excess calories    Lipoma of joint    Left lumbar radiculitis    Left leg swelling    Hyperlipidemia    GERD (gastroesophageal reflux disease)    Dysphagia    Dysmetabolic syndrome    Dercum's disease    Debility    Cataract, nuclear sclerotic, right eye    Parrish's esophagus    Chronic low back pain  "   Impaired fasting glucose    Positive colorectal cancer screening using Cologuard test    Vertigo       Past Medical History:   Diagnosis Date    Other intervertebral disc displacement, lumbar region 05/10/2018    Herniated lumbar disc without myelopathy    Personal history of other diseases of the nervous system and sense organs 02/16/2018    History of sciatica        Past Surgical History:   Procedure Laterality Date    ANKLE SURGERY      POLYPECTOMY          Social History     Tobacco Use   Smoking Status Never   Smokeless Tobacco Never      Social History     Substance and Sexual Activity   Alcohol Use Never        Family History   Problem Relation Name Age of Onset    No Known Problems Mother      No Known Problems Father          Home Meds:  Current Outpatient Medications   Medication Instructions    acetaminophen (TYLENOL) 1,000 mg, oral, Every 6 hours PRN    multivitamin tablet 1 tablet, oral, Daily RT    naproxen sodium (Aleve) 220 mg tablet oral    omeprazole (PRILOSEC) 40 mg, oral, Daily    psyllium (Metamucil) powder oral    tiZANidine (Zanaflex) 4 mg tablet Take one bid as needed        Allergies   Allergen Reactions    Penicillins Hives        Objective   Vitals:    09/20/24 1248   BP: 132/70   Pulse: 96   Resp: 16      Vitals:    09/20/24 1248   Weight: 118 kg (261 lb)      Body mass index is 49.32 kg/m².   Physical Exam    Labs:  Lab Results   Component Value Date    HGBA1C 5.2 05/11/2023    LDLDIRECT 138 (H) 08/11/2021    TSH 1.72 08/11/2021      No results found for: \"PR1\", \"THYROIDPAB\", \"TSI\"     Assessment/Plan   Assessment & Plan  Obesity, Class III, BMI 40-49.9 (morbid obesity) (Multi)    Orders:    Referral to Endocrinology    Comprehensive Metabolic Panel; Future    CBC; Future    Hemoglobin A1C; Future    TSH with reflex to Free T4 if abnormal; Future    phentermine-topiramate (Qsymia) 7.5-46 mg capsule, ER multiphase 24 hr; Take 1 capsule by mouth once daily.    phentermine-topiramate " (Qsymia) 3.75-23 mg capsule, ER multiphase 24 hr; Take 1 capsule by mouth once daily for 14 days.  discussed course  and options.  Discussed limits of weight loss med coverage on medicare  Discussed qsymia which she would like to try  Discussed potential side effects  Follow up in 3 months    Electronically signed by:  Stacie Mullins MD 09/20/24  12:53 PM

## 2024-12-20 ENCOUNTER — APPOINTMENT (OUTPATIENT)
Dept: ENDOCRINOLOGY | Facility: CLINIC | Age: 79
End: 2024-12-20
Payer: MEDICARE

## 2024-12-20 VITALS
WEIGHT: 246.8 LBS | HEART RATE: 76 BPM | DIASTOLIC BLOOD PRESSURE: 70 MMHG | SYSTOLIC BLOOD PRESSURE: 124 MMHG | HEIGHT: 61 IN | RESPIRATION RATE: 16 BRPM | BODY MASS INDEX: 46.6 KG/M2

## 2024-12-20 DIAGNOSIS — E66.01 OBESITY, CLASS III, BMI 40-49.9 (MORBID OBESITY) (MULTI): Primary | ICD-10-CM

## 2024-12-20 PROCEDURE — 1036F TOBACCO NON-USER: CPT | Performed by: INTERNAL MEDICINE

## 2024-12-20 PROCEDURE — 1160F RVW MEDS BY RX/DR IN RCRD: CPT | Performed by: INTERNAL MEDICINE

## 2024-12-20 PROCEDURE — G2211 COMPLEX E/M VISIT ADD ON: HCPCS | Performed by: INTERNAL MEDICINE

## 2024-12-20 PROCEDURE — 99213 OFFICE O/P EST LOW 20 MIN: CPT | Performed by: INTERNAL MEDICINE

## 2024-12-20 PROCEDURE — 1159F MED LIST DOCD IN RCRD: CPT | Performed by: INTERNAL MEDICINE

## 2024-12-20 PROCEDURE — 1123F ACP DISCUSS/DSCN MKR DOCD: CPT | Performed by: INTERNAL MEDICINE

## 2024-12-20 RX ORDER — PHENTERMINE AND TOPIRAMATE 7.5; 46 MG/1; MG/1
1 CAPSULE, EXTENDED RELEASE ORAL DAILY
Qty: 30 CAPSULE | Refills: 2 | Status: SHIPPED | OUTPATIENT
Start: 2024-12-20

## 2024-12-20 ASSESSMENT — ENCOUNTER SYMPTOMS
FEVER: 0
VOMITING: 0
CHILLS: 0
HEADACHES: 0
FATIGUE: 0
SHORTNESS OF BREATH: 0
PALPITATIONS: 0
DIARRHEA: 0
COUGH: 0
NAUSEA: 0

## 2024-12-20 NOTE — PROGRESS NOTES
Endocrinology: Follow up visit  Subjective   Patient ID: Vee Reynoso is a 79 y.o. female who presents for Obesity.    PCP: Nick Johnson MD    HPI  Since last visit started qsymia for obesity  Doing great on it.  Dry mouth and occ constipation but manageable    Down 15 lbs which she is thrilled about     I have personally reviewed the OARRS report for  SB  . This is recorded in the EMR.  I have considered the risks of abuse, dependence, addiction, and diversion.  I believe that it is clinically appropriate for   SB to be prescribed this medication.      Review of Systems   Constitutional:  Negative for chills, fatigue and fever.   Respiratory:  Negative for cough and shortness of breath.    Cardiovascular:  Negative for chest pain and palpitations.   Gastrointestinal:  Negative for diarrhea, nausea and vomiting.   Neurological:  Negative for headaches.       Patient Active Problem List   Diagnosis    Vitamin D deficiency    Pseudophakia of left eye    Primary osteoarthritis of both knees    Positive Libby test of left knee    Pain in both hands    Obesity, Class III, BMI 40-49.9 (morbid obesity) (Multi)    Obesity due to excess calories    Lipoma of joint    Left lumbar radiculitis    Left leg swelling    Hyperlipidemia    GERD (gastroesophageal reflux disease)    Dysphagia    Dysmetabolic syndrome    Dercum's disease    Debility    Cataract, nuclear sclerotic, right eye    Parrish's esophagus    Chronic low back pain    Impaired fasting glucose    Positive colorectal cancer screening using Cologuard test    Vertigo        Home Meds:  Current Outpatient Medications   Medication Instructions    acetaminophen (TYLENOL) 1,000 mg, Every 6 hours PRN    multivitamin tablet 1 tablet, Daily RT    naproxen sodium (Aleve) 220 mg tablet Take by mouth.    omeprazole (PRILOSEC) 40 mg, Daily    phentermine-topiramate (Qsymia) 3.75-23 mg capsule, ER multiphase 24 hr 1 capsule, oral, Daily    phentermine-topiramate (Qsymia)  "7.5-46 mg capsule, ER multiphase 24 hr 1 capsule, oral, Daily    psyllium (Metamucil) powder Take by mouth.    tiZANidine (Zanaflex) 4 mg tablet Take one bid as needed        Allergies   Allergen Reactions    Penicillins Hives        Objective   Vitals:    12/20/24 1224   BP: 124/70   Pulse: 76   Resp: 16      Vitals:    12/20/24 1224   Weight: 112 kg (246 lb 12.8 oz)      Body mass index is 46.63 kg/m².   Physical Exam  Constitutional:       Appearance: Normal appearance. She is overweight.   HENT:      Head: Normocephalic and atraumatic.   Neck:      Thyroid: No thyroid mass, thyromegaly or thyroid tenderness.   Cardiovascular:      Rate and Rhythm: Normal rate and regular rhythm.      Heart sounds: No murmur heard.     No gallop.   Pulmonary:      Effort: Pulmonary effort is normal.      Breath sounds: Normal breath sounds.   Abdominal:      Palpations: Abdomen is soft.      Comments: benign   Neurological:      General: No focal deficit present.      Mental Status: She is alert and oriented to person, place, and time.      Deep Tendon Reflexes: Reflexes are normal and symmetric.   Psychiatric:         Behavior: Behavior is cooperative.         Labs:  Lab Results   Component Value Date    HGBA1C 5.6 09/20/2024    LDLDIRECT 138 (H) 08/11/2021    TSH 1.64 09/20/2024      No results found for: \"PR1\", \"THYROIDPAB\", \"TSI\"     Assessment/Plan   Assessment & Plan  Obesity, Class III, BMI 40-49.9 (morbid obesity) (Multi)    Doing great on qsymia  Continue current dose  Follow up in 3 months      Electronically signed by:  Stacie Mullins MD 12/20/24 12:44 PM              "

## 2025-01-29 ENCOUNTER — TELEPHONE (OUTPATIENT)
Dept: PRIMARY CARE | Facility: CLINIC | Age: 80
End: 2025-01-29

## 2025-03-21 ENCOUNTER — APPOINTMENT (OUTPATIENT)
Facility: CLINIC | Age: 80
End: 2025-03-21
Payer: MEDICARE

## 2025-03-28 ENCOUNTER — TELEPHONE (OUTPATIENT)
Facility: CLINIC | Age: 80
End: 2025-03-28

## 2025-03-28 ENCOUNTER — APPOINTMENT (OUTPATIENT)
Facility: CLINIC | Age: 80
End: 2025-03-28
Payer: MEDICARE

## 2025-03-28 VITALS
RESPIRATION RATE: 16 BRPM | WEIGHT: 235.1 LBS | HEART RATE: 80 BPM | BODY MASS INDEX: 44.39 KG/M2 | DIASTOLIC BLOOD PRESSURE: 82 MMHG | HEIGHT: 61 IN | SYSTOLIC BLOOD PRESSURE: 120 MMHG

## 2025-03-28 DIAGNOSIS — E66.01 OBESITY, CLASS III, BMI 40-49.9 (MORBID OBESITY) (MULTI): Primary | ICD-10-CM

## 2025-03-28 PROCEDURE — 1036F TOBACCO NON-USER: CPT | Performed by: INTERNAL MEDICINE

## 2025-03-28 PROCEDURE — G2211 COMPLEX E/M VISIT ADD ON: HCPCS | Performed by: INTERNAL MEDICINE

## 2025-03-28 PROCEDURE — 1123F ACP DISCUSS/DSCN MKR DOCD: CPT | Performed by: INTERNAL MEDICINE

## 2025-03-28 PROCEDURE — 1159F MED LIST DOCD IN RCRD: CPT | Performed by: INTERNAL MEDICINE

## 2025-03-28 PROCEDURE — 99213 OFFICE O/P EST LOW 20 MIN: CPT | Performed by: INTERNAL MEDICINE

## 2025-03-28 PROCEDURE — 1160F RVW MEDS BY RX/DR IN RCRD: CPT | Performed by: INTERNAL MEDICINE

## 2025-03-28 RX ORDER — PHENTERMINE AND TOPIRAMATE 11.25; 69 MG/1; MG/1
1 CAPSULE, EXTENDED RELEASE ORAL DAILY
Qty: 30 CAPSULE | Refills: 3 | Status: SHIPPED | OUTPATIENT
Start: 2025-03-28 | End: 2025-09-24

## 2025-03-28 ASSESSMENT — ENCOUNTER SYMPTOMS
DIARRHEA: 0
HEADACHES: 0
CHILLS: 0
SHORTNESS OF BREATH: 0
VOMITING: 0
FEVER: 0
FATIGUE: 0
NAUSEA: 0
PALPITATIONS: 0
COUGH: 0

## 2025-03-28 NOTE — PROGRESS NOTES
Endocrinology: Follow up visit  Subjective   Patient ID: Vee Reynoso is a 79 y.o. female who presents for Obesity.    PCP: Nick Johnson MD    HPI  Since last visit started qsymia for obesity  Doing great on it.  Dry mouth and occ constipation but manageable  Feels like sweet craving is back slightly and wants to increase dose.    Down another 10 lbs which she is thrilled about      I have personally reviewed the OARRS report for  SB  . This is recorded in the EMR.  I have considered the risks of abuse, dependence, addiction, and diversion.  I believe that it is clinically appropriate for   SB to be prescribed this medication.        Review of Systems   Constitutional:  Negative for chills, fatigue and fever.   Respiratory:  Negative for cough and shortness of breath.    Cardiovascular:  Negative for chest pain and palpitations.   Gastrointestinal:  Negative for diarrhea, nausea and vomiting.   Neurological:  Negative for headaches.       Patient Active Problem List   Diagnosis    Vitamin D deficiency    Pseudophakia of left eye    Primary osteoarthritis of both knees    Positive Libby test of left knee    Pain in both hands    Obesity, Class III, BMI 40-49.9 (morbid obesity) (Multi)    Obesity due to excess calories    Lipoma of joint    Left lumbar radiculitis    Left leg swelling    Hyperlipidemia    GERD (gastroesophageal reflux disease)    Dysphagia    Dysmetabolic syndrome    Dercum's disease    Debility    Cataract, nuclear sclerotic, right eye    Parrish's esophagus    Chronic low back pain    Impaired fasting glucose    Positive colorectal cancer screening using Cologuard test    Vertigo        Home Meds:  Current Outpatient Medications   Medication Instructions    acetaminophen (TYLENOL) 1,000 mg, Every 6 hours PRN    multivitamin tablet 1 tablet, Daily RT    naproxen sodium (Aleve) 220 mg tablet Take by mouth.    omeprazole (PRILOSEC) 40 mg, Daily    phentermine-topiramate (Qsymia) 3.75-23 mg  "capsule, ER multiphase 24 hr 1 capsule, oral, Daily    phentermine-topiramate (Qsymia) 7.5-46 mg capsule, ER multiphase 24 hr 1 capsule, oral, Daily    psyllium (Metamucil) powder Take by mouth.    tiZANidine (Zanaflex) 4 mg tablet Take one bid as needed        Allergies   Allergen Reactions    Penicillins Hives        Objective   Vitals:    03/28/25 1210   BP: 120/82   Pulse: 80   Resp: 16      Vitals:    03/28/25 1210   Weight: 107 kg (235 lb 1.6 oz)      Body mass index is 44.42 kg/m².   Physical Exam  Constitutional:       Appearance: Normal appearance. She is overweight.   HENT:      Head: Normocephalic and atraumatic.   Neck:      Thyroid: No thyroid mass, thyromegaly or thyroid tenderness.   Cardiovascular:      Rate and Rhythm: Normal rate and regular rhythm.      Heart sounds: No murmur heard.     No gallop.   Pulmonary:      Effort: Pulmonary effort is normal.      Breath sounds: Normal breath sounds.   Abdominal:      Palpations: Abdomen is soft.      Comments: benign   Neurological:      General: No focal deficit present.      Mental Status: She is alert and oriented to person, place, and time.      Deep Tendon Reflexes: Reflexes are normal and symmetric.   Psychiatric:         Behavior: Behavior is cooperative.         Labs:  Lab Results   Component Value Date    HGBA1C 5.6 09/20/2024    LDLDIRECT 138 (H) 08/11/2021    TSH 1.64 09/20/2024      No results found for: \"PR1\", \"THYROIDPAB\", \"TSI\"     Assessment/Plan   Assessment & Plan  Obesity, Class III, BMI 40-49.9 (morbid obesity) (Multi)  Labs with upcoming physical   Increase qsymia dose  Follow up in 3 months  Orders:    Comprehensive Metabolic Panel; Future    phentermine-topiramate (Qsymia) 11.25-69 mg capsule, ER multiphase 24 hr; Take 1 capsule by mouth once daily.        Electronically signed by:  Stacie Mullins MD 03/28/25 12:11 PM              "

## 2025-03-28 NOTE — ASSESSMENT & PLAN NOTE
Labs with upcoming physical   Increase qsymia dose  Follow up in 3 months  Orders:    Comprehensive Metabolic Panel; Future    phentermine-topiramate (Qsymia) 11.25-69 mg capsule, ER multiphase 24 hr; Take 1 capsule by mouth once daily.

## 2025-05-12 ENCOUNTER — APPOINTMENT (OUTPATIENT)
Dept: PRIMARY CARE | Facility: CLINIC | Age: 80
End: 2025-05-12
Payer: MEDICARE

## 2025-05-12 VITALS
BODY MASS INDEX: 43.99 KG/M2 | TEMPERATURE: 98 F | DIASTOLIC BLOOD PRESSURE: 81 MMHG | WEIGHT: 233 LBS | OXYGEN SATURATION: 94 % | SYSTOLIC BLOOD PRESSURE: 130 MMHG | HEART RATE: 113 BPM | HEIGHT: 61 IN

## 2025-05-12 DIAGNOSIS — E66.813 OBESITY, CLASS III, BMI 40-49.9 (MORBID OBESITY): ICD-10-CM

## 2025-05-12 DIAGNOSIS — Z00.00 MEDICARE ANNUAL WELLNESS VISIT, SUBSEQUENT: Primary | ICD-10-CM

## 2025-05-12 DIAGNOSIS — M54.31 SCIATICA OF RIGHT SIDE: ICD-10-CM

## 2025-05-12 PROCEDURE — G0439 PPPS, SUBSEQ VISIT: HCPCS | Performed by: INTERNAL MEDICINE

## 2025-05-12 PROCEDURE — 1036F TOBACCO NON-USER: CPT | Performed by: INTERNAL MEDICINE

## 2025-05-12 PROCEDURE — 1158F ADVNC CARE PLAN TLK DOCD: CPT | Performed by: INTERNAL MEDICINE

## 2025-05-12 PROCEDURE — 1159F MED LIST DOCD IN RCRD: CPT | Performed by: INTERNAL MEDICINE

## 2025-05-12 PROCEDURE — 1170F FXNL STATUS ASSESSED: CPT | Performed by: INTERNAL MEDICINE

## 2025-05-12 ASSESSMENT — ACTIVITIES OF DAILY LIVING (ADL)
TAKING_MEDICATION: INDEPENDENT
DRESSING: INDEPENDENT
DOING_HOUSEWORK: INDEPENDENT
GROCERY_SHOPPING: INDEPENDENT
MANAGING_FINANCES: INDEPENDENT
BATHING: INDEPENDENT

## 2025-05-12 ASSESSMENT — PATIENT HEALTH QUESTIONNAIRE - PHQ9
SUM OF ALL RESPONSES TO PHQ9 QUESTIONS 1 AND 2: 0
9. THOUGHTS THAT YOU WOULD BE BETTER OFF DEAD, OR OF HURTING YOURSELF: NOT AT ALL
4. FEELING TIRED OR HAVING LITTLE ENERGY: NOT AT ALL
6. FEELING BAD ABOUT YOURSELF - OR THAT YOU ARE A FAILURE OR HAVE LET YOURSELF OR YOUR FAMILY DOWN: NOT AT ALL
8. MOVING OR SPEAKING SO SLOWLY THAT OTHER PEOPLE COULD HAVE NOTICED. OR THE OPPOSITE, BEING SO FIGETY OR RESTLESS THAT YOU HAVE BEEN MOVING AROUND A LOT MORE THAN USUAL: NOT AT ALL
3. TROUBLE FALLING OR STAYING ASLEEP OR SLEEPING TOO MUCH: NOT AT ALL
7. TROUBLE CONCENTRATING ON THINGS, SUCH AS READING THE NEWSPAPER OR WATCHING TELEVISION: NOT AT ALL
2. FEELING DOWN, DEPRESSED OR HOPELESS: NOT AT ALL
SUM OF ALL RESPONSES TO PHQ QUESTIONS 1-9: 0
5. POOR APPETITE OR OVEREATING: NOT AT ALL
1. LITTLE INTEREST OR PLEASURE IN DOING THINGS: NOT AT ALL

## 2025-05-12 NOTE — PROGRESS NOTES
"Nick Johnson MD  SUBJECTIVE:     Vee Reynoso is a 79 y.o. female presenting for her annual physical/wellness.    Doing well. Has lost weight on Qsymia, seeing Dr. Mullins.  No palpation sob cp leg edema.    Colon screenin. No polyps  Last pap: na  Last mammogram: she does not wish to get  Dexa Up to date   Vaccines Up to date except for tdap which she is due  Diet: healthy in general  Exercises: somewhat regularly  Lab Results   Component Value Date    HGBA1C 5.6 2024     Lab Results   Component Value Date    CREATININE 0.87 2024     Lab Results   Component Value Date    WBC 8.5 2024    HGB 15.3 2024    HCT 47.9 (H) 2024    MCV 92 2024     2024     Lab Results   Component Value Date    CHOL 207 (H) 2023    CHOL 221 (H) 2022    CHOL 216 (H) 2021     Lab Results   Component Value Date    HDL 69.5 2023    HDL 69.8 2022    HDL 58.1 2021     No results found for: \"LDLCALC\"  Lab Results   Component Value Date    TRIG 91 2023    TRIG 125 2022       ROS:  Feeling well. No dyspnea or chest pain on exertion. No abdominal pain, change in bowel habits, black or bloody stools. No urinary tract or  symptoms.  No breast concerns. No neurological complaints.    OBJECTIVE:   The patient appears well, alert, oriented x 3, in no distress.   /81   Pulse (!) 113   Temp 36.7 °C (98 °F)   Ht 1.556 m (5' 1.25\")   Wt 106 kg (233 lb)   SpO2 94%   BMI 43.67 kg/m²   ENT normal.  Neck supple. No adenopathy or thyromegaly. CHAYA. Lungs are clear, good air entry, no wheezes, rhonchi or rales. S1 and S2 normal, no murmurs, regular rate and rhythm. Abdomen is soft without tenderness, guarding, mass or organomegaly.  exam deferred to Gyn. Extremities show no edema, normal peripheral pulses. Neurological is normal without focal findings.    Assessment & Plan  Medicare annual wellness visit, subsequent         Sciatica of right " side    Orders:    Referral to Physical Therapy; Future    Obesity, Class III, BMI 40-49.9 (morbid obesity)  Continue care under Dr. Mullins.            Medicare Wellness Billing Compliance Satisfied    *This is a visual tool to show completion of required items on the day of the visit. Green checks will only appear on the date of visit.    Review all medications by prescribing practitioner or clinical pharmacist (such as prescriptions, OTCs, herbal therapies and supplements) documented in the medical record    Past Medical, Surgical, and Family History reviewed and updated in chart    Tobacco Use Reviewed    Alcohol Use Reviewed    Illicit Drug Use Reviewed    PHQ2/9    Falls in Last Year Reviewed    Home Safety Risk Factors Reviewed    Cognitive Impairment Reviewed    Patient Self Assessment and Health Status    Current Diet Reviewed    Exercise Frequency    ADL - Hearing Impairment    ADL - Bathing    ADL - Dressing    ADL - Walks in Home    IADL - Managing Finances    IADL - Grocery Shopping    IADL - Taking Medications    IADL - Doing Housework

## 2025-06-20 ENCOUNTER — APPOINTMENT (OUTPATIENT)
Facility: CLINIC | Age: 80
End: 2025-06-20
Payer: MEDICARE

## 2025-06-20 VITALS
BODY MASS INDEX: 43.24 KG/M2 | SYSTOLIC BLOOD PRESSURE: 119 MMHG | DIASTOLIC BLOOD PRESSURE: 76 MMHG | WEIGHT: 230.7 LBS | HEART RATE: 83 BPM

## 2025-06-20 DIAGNOSIS — E66.813 OBESITY, CLASS III, BMI 40-49.9 (MORBID OBESITY): Primary | ICD-10-CM

## 2025-06-20 PROCEDURE — 1126F AMNT PAIN NOTED NONE PRSNT: CPT | Performed by: INTERNAL MEDICINE

## 2025-06-20 PROCEDURE — 1159F MED LIST DOCD IN RCRD: CPT | Performed by: INTERNAL MEDICINE

## 2025-06-20 PROCEDURE — 1036F TOBACCO NON-USER: CPT | Performed by: INTERNAL MEDICINE

## 2025-06-20 PROCEDURE — 99213 OFFICE O/P EST LOW 20 MIN: CPT | Performed by: INTERNAL MEDICINE

## 2025-06-20 PROCEDURE — G2211 COMPLEX E/M VISIT ADD ON: HCPCS | Performed by: INTERNAL MEDICINE

## 2025-06-20 PROCEDURE — 1160F RVW MEDS BY RX/DR IN RCRD: CPT | Performed by: INTERNAL MEDICINE

## 2025-06-20 RX ORDER — PREDNISOLONE ACETATE 10 MG/ML
SUSPENSION/ DROPS OPHTHALMIC
COMMUNITY
Start: 2025-05-16

## 2025-06-20 ASSESSMENT — ENCOUNTER SYMPTOMS
HEADACHES: 0
DIARRHEA: 0
LOSS OF SENSATION IN FEET: 0
NAUSEA: 0
COUGH: 0
DEPRESSION: 0
CHILLS: 0
FATIGUE: 0
VOMITING: 0
PALPITATIONS: 0
SHORTNESS OF BREATH: 0
OCCASIONAL FEELINGS OF UNSTEADINESS: 0
FEVER: 0

## 2025-06-20 ASSESSMENT — LIFESTYLE VARIABLES
HOW OFTEN DO YOU HAVE A DRINK CONTAINING ALCOHOL: NEVER
AUDIT-C TOTAL SCORE: 0
HOW MANY STANDARD DRINKS CONTAINING ALCOHOL DO YOU HAVE ON A TYPICAL DAY: PATIENT DOES NOT DRINK
HOW OFTEN DO YOU HAVE SIX OR MORE DRINKS ON ONE OCCASION: NEVER
SKIP TO QUESTIONS 9-10: 1

## 2025-06-20 ASSESSMENT — PATIENT HEALTH QUESTIONNAIRE - PHQ9
2. FEELING DOWN, DEPRESSED OR HOPELESS: NOT AT ALL
SUM OF ALL RESPONSES TO PHQ9 QUESTIONS 1 & 2: 0
1. LITTLE INTEREST OR PLEASURE IN DOING THINGS: NOT AT ALL

## 2025-06-20 ASSESSMENT — PAIN SCALES - GENERAL: PAINLEVEL_OUTOF10: 0-NO PAIN

## 2025-06-20 NOTE — PROGRESS NOTES
Endocrinology: Follow up visit  Subjective   Patient ID: Vee Reynoso is a 79 y.o. female who presents for No chief complaint on file..    PCP: Nick Johnson MD    HPI  Since last visit increased qsymia dose  Doing fine on it.    Weight down 5 lbs but feels like it is slowing down.   Working on eating but very inactive.      I have personally reviewed the OARRS report for  SB  . This is recorded in the EMR.  I have considered the risks of abuse, dependence, addiction, and diversion.  I believe that it is clinically appropriate for   SB to be prescribed this medication.  Review of Systems   Constitutional:  Negative for chills, fatigue and fever.   Respiratory:  Negative for cough and shortness of breath.    Cardiovascular:  Negative for chest pain and palpitations.   Gastrointestinal:  Negative for diarrhea, nausea and vomiting.   Neurological:  Negative for headaches.       Problem List[1]     Home Meds:  Current Outpatient Medications   Medication Instructions    acetaminophen (TYLENOL) 1,000 mg, Every 6 hours PRN    multivitamin tablet 1 tablet, Daily RT    naproxen sodium (Aleve) 220 mg tablet Take by mouth.    omeprazole (PRILOSEC) 40 mg, Daily    phentermine-topiramate (Qsymia) 11.25-69 mg capsule, ER multiphase 24 hr 1 capsule, oral, Daily    prednisoLONE acetate (Pred-Forte) 1 % ophthalmic suspension SHAKE LIQUID AND INSTILL 1 DROP IN RIGHT EYE FOUR TIMES DAILY FOR 21 DAYS. START AFTER CATARACT SURGERY    psyllium (Metamucil) powder Take by mouth.    tiZANidine (Zanaflex) 4 mg tablet Take one bid as needed        RX Allergies[2]     Objective   There were no vitals filed for this visit.   There were no vitals filed for this visit.   There is no height or weight on file to calculate BMI.   Physical Exam  Constitutional:       Appearance: Normal appearance. She is overweight.   HENT:      Head: Normocephalic and atraumatic.   Neck:      Thyroid: No thyroid mass, thyromegaly or thyroid tenderness.  "  Cardiovascular:      Rate and Rhythm: Normal rate and regular rhythm.      Heart sounds: No murmur heard.     No gallop.   Pulmonary:      Effort: Pulmonary effort is normal.      Breath sounds: Normal breath sounds.   Abdominal:      Palpations: Abdomen is soft.      Comments: benign   Neurological:      General: No focal deficit present.      Mental Status: She is alert and oriented to person, place, and time.      Deep Tendon Reflexes: Reflexes are normal and symmetric.   Psychiatric:         Behavior: Behavior is cooperative.         Labs:  Lab Results   Component Value Date    HGBA1C 5.6 09/20/2024    LDLDIRECT 138 (H) 08/11/2021    TSH 1.64 09/20/2024      No results found for: \"PR1\", \"THYROIDPAB\", \"TSI\"     Assessment/Plan   Assessment & Plan  Obesity, Class III, BMI 40-49.9 (morbid obesity)    Doing fine on qsymia  Must increase activity  Follow up in 3 months      Electronically signed by:  Stacie Mullins MD 06/20/25 4:26 PM                   [1]   Patient Active Problem List  Diagnosis    Vitamin D deficiency    Pseudophakia of left eye    Primary osteoarthritis of both knees    Positive Libby test of left knee    Pain in both hands    Obesity, Class III, BMI 40-49.9 (morbid obesity)    Obesity due to excess calories    Lipoma of joint    Left lumbar radiculitis    Left leg swelling    Hyperlipidemia    GERD (gastroesophageal reflux disease)    Dysphagia    Dysmetabolic syndrome    Dercum's disease    Debility    Cataract, nuclear sclerotic, right eye    Parrish's esophagus    Chronic low back pain    Impaired fasting glucose    Positive colorectal cancer screening using Cologuard test    Vertigo   [2]   Allergies  Allergen Reactions    Penicillins Hives     "

## 2025-07-07 ENCOUNTER — TELEPHONE (OUTPATIENT)
Facility: CLINIC | Age: 80
End: 2025-07-07
Payer: MEDICARE

## 2025-07-07 NOTE — TELEPHONE ENCOUNTER
Patient  called in states that she has developed red splotches on her arms and on face around her eyes/ pt states it started in the past 5-6 days. Pt thinks maybe be to the Wine in [place] 11.25/69. pt started that dose 2-3 weeks ago/. Pt did start a new body wash but does not think it is from that. Pt also had cataract surgery a few weeks ago. Mariuszt would like to know what she should do

## 2025-08-25 ENCOUNTER — APPOINTMENT (OUTPATIENT)
Dept: PRIMARY CARE | Facility: CLINIC | Age: 80
End: 2025-08-25
Payer: MEDICARE

## 2025-09-19 ENCOUNTER — APPOINTMENT (OUTPATIENT)
Facility: CLINIC | Age: 80
End: 2025-09-19
Payer: MEDICARE

## 2025-12-19 ENCOUNTER — APPOINTMENT (OUTPATIENT)
Facility: CLINIC | Age: 80
End: 2025-12-19
Payer: MEDICARE

## 2026-03-20 ENCOUNTER — APPOINTMENT (OUTPATIENT)
Facility: CLINIC | Age: 81
End: 2026-03-20
Payer: MEDICARE